# Patient Record
Sex: MALE | Race: BLACK OR AFRICAN AMERICAN | NOT HISPANIC OR LATINO | Employment: OTHER | URBAN - METROPOLITAN AREA
[De-identification: names, ages, dates, MRNs, and addresses within clinical notes are randomized per-mention and may not be internally consistent; named-entity substitution may affect disease eponyms.]

---

## 2022-08-22 ENCOUNTER — HOSPITAL ENCOUNTER (EMERGENCY)
Facility: HOSPITAL | Age: 42
Discharge: HOME | End: 2022-08-22
Attending: EMERGENCY MEDICINE

## 2022-08-22 VITALS
HEIGHT: 76 IN | TEMPERATURE: 98 F | BODY MASS INDEX: 26.18 KG/M2 | HEART RATE: 74 BPM | OXYGEN SATURATION: 98 % | WEIGHT: 215 LBS | DIASTOLIC BLOOD PRESSURE: 91 MMHG | SYSTOLIC BLOOD PRESSURE: 132 MMHG | RESPIRATION RATE: 16 BRPM

## 2022-08-22 DIAGNOSIS — S05.00XA CORNEAL ABRASION, UNSPECIFIED LATERALITY, INITIAL ENCOUNTER: Primary | ICD-10-CM

## 2022-08-22 PROCEDURE — 99283 EMERGENCY DEPT VISIT LOW MDM: CPT | Mod: ER

## 2022-08-22 PROCEDURE — 25000003 PHARM REV CODE 250: Mod: ER | Performed by: NURSE PRACTITIONER

## 2022-08-22 RX ORDER — ERYTHROMYCIN 5 MG/G
OINTMENT OPHTHALMIC
Status: COMPLETED | OUTPATIENT
Start: 2022-08-22 | End: 2022-08-22

## 2022-08-22 RX ORDER — TETRACAINE HYDROCHLORIDE 5 MG/ML
2 SOLUTION OPHTHALMIC
Status: COMPLETED | OUTPATIENT
Start: 2022-08-22 | End: 2022-08-22

## 2022-08-22 RX ORDER — ERYTHROMYCIN 5 MG/G
OINTMENT OPHTHALMIC
Qty: 3.5 G | Refills: 0 | Status: SHIPPED | OUTPATIENT
Start: 2022-08-22

## 2022-08-22 RX ADMIN — ERYTHROMYCIN 1 INCH: 5 OINTMENT OPHTHALMIC at 10:08

## 2022-08-22 RX ADMIN — FLUORESCEIN SODIUM 1 EACH: 1 STRIP OPHTHALMIC at 08:08

## 2022-08-22 RX ADMIN — TETRACAINE HYDROCHLORIDE 2 DROP: 5 SOLUTION OPHTHALMIC at 08:08

## 2022-08-22 NOTE — ED PROVIDER NOTES
"Encounter Date: 8/22/2022       History     Chief Complaint   Patient presents with    Foreign Body in Eye     For 2-3 days, , denies visual distrubances     HPI      41y M w/ no PMH presents w/ bilat eye pain. He believes he "has something caught under his R eye lid". He is a  and believes he accidentally got something in his eyes 2 days ago. He reports he was wearing contacts at the time. He has tried OTC eye drops w/o success. He denies blurred vision or double vision. He reports his tetanus is UTD. Denies allergies.  Review of patient's allergies indicates:  No Known Allergies  History reviewed. No pertinent past medical history.  History reviewed. No pertinent surgical history.  History reviewed. No pertinent family history.  Social History     Tobacco Use    Smoking status: Current Every Day Smoker    Smokeless tobacco: Never Used   Substance Use Topics    Alcohol use: Not Currently     Comment: occ    Drug use: Never     Review of Systems  Constitutional: Negative for appetite change, chills, diaphoresis, fatigue and fever.   HENT: Negative for congestion, ear discharge, ear pain, postnasal drip, rhinorrhea, sinus pressure, sneezing, sore throat and voice change.    Eyes: Positive for pain and redness. Negative for photophobia, discharge, itching and visual disturbance.   Respiratory: Negative for cough, shortness of breath and wheezing.    Cardiovascular: Negative for chest pain, palpitations and leg swelling.   Gastrointestinal: Negative for abdominal pain, nausea and vomiting.   Endocrine: Negative for polydipsia, polyphagia and polyuria.   Genitourinary: Negative for difficulty urinating, dysuria, frequency, hematuria, penile discharge, penile pain, penile swelling and urgency.   Musculoskeletal: Negative for arthralgias and myalgias.   Skin: Negative for rash and wound.   Neurological: Negative for dizziness, seizures, syncope and weakness.   Hematological: Negative for adenopathy. Does " not bruise/bleed easily.   Psychiatric/Behavioral: Negative for agitation and self-injury. The patient is not nervous/anxious.    Physical Exam     Initial Vitals [08/22/22 0822]   BP Pulse Resp Temp SpO2   134/89 71 20 97.6 °F (36.4 °C) 97 %      MAP       --         Physical Exam  Nursing note and vitals reviewed.  Constitutional: He appears well-developed and well-nourished. He is not diaphoretic. No distress.   HENT:   Head: Normocephalic and atraumatic.   Right Ear: External ear normal.   Left Ear: External ear normal.   Nose: Nose normal.   Eyes: Conjunctivae, EOM and lids are normal. Pupils are equal, round, and reactive to light. Lids are everted and swept, no foreign bodies found. Right eye exhibits no discharge and no exudate. No foreign body present in the right eye. Left eye exhibits no discharge and no exudate. No foreign body present in the left eye. No scleral icterus. Fluorescein aided exam demonstrates <1mm area of uptake on the right eye, border of iris laterally. IOP 15bilat with 95% CI  Neck:   Normal range of motion.  Cardiovascular: Normal rate, regular rhythm, normal heart sounds and intact distal pulses.   Pulmonary/Chest: No respiratory distress. He has no wheezes. He has no rhonchi. He has no rales.   Abdominal: Abdomen is soft. Bowel sounds are normal. He exhibits no distension.   Musculoskeletal:         General: Normal range of motion.      Cervical back: Normal range of motion.     Neurological: He is alert and oriented to person, place, and time.   Skin: Skin is warm and dry. Capillary refill takes less than 2 seconds. No rash noted.   Psychiatric: He has a normal mood and affect. Thought content normal.   ED Course   Procedures  Labs Reviewed - No data to display       Imaging Results    None          Medications   TETRAcaine HCl (PF) 0.5 % Drop 2 drop (2 drops Both Eyes Given 8/22/22 0857)   fluorescein ophthalmic strip 1 each (1 each Both Eyes Given 8/22/22 0857)   erythromycin 5  mg/gram (0.5 %) ophthalmic ointment (1 inch Both Eyes Given 8/22/22 1004)                 ED Course as of 08/22/22 1123   Mon Aug 22, 2022   0845 BP: 134/89 [VC]   0845 Temp: 97.6 °F (36.4 °C) [VC]   0845 Temp src: Oral [VC]   0845 Pulse: 71 [VC]   0845 Resp: 20 [VC]   0845 SpO2: 97 % [VC]   0845 BP: 134/89 [VC]   0845 Temp: 97.6 °F (36.4 °C) [VC]   0845 Temp src: Oral [VC]   0845 Pulse: 71 [VC]   0845 Resp: 20 [VC]   0845 SpO2: 97 % [VC]   0851 42yo male who is a  reports 2d ago felt like something was in his right eye. Wears contacts and was wearing them when this happened.  There is photophobia.  Feeling of foreign body.  Pain worse with eye closed. Has tried otc eye drops unknown.  Dt is utd. [VC]      ED Course User Index  [VC] Franco Rocha, MAUREEN          on examination there was a small area of uptake on the right eye.  The right lower lid was also mildly swollen which may indicate an oncoming hordeola.  Secondary to the patient's history I do not believe that this is preseptal cellulitis or orbital cellulitis.  Other differentials include ruptured globe, foreign body, rust ring.  Erythromycin ointment was applied patient was prescribed same and he should follow up with Ophthalmology, wear protective eye wear.      See AVS for additional recommendations. Medications listed herein were prescribed after reviewing the patient's allergies, medication list, history, most recent laboratories as available.  Referrals below were provided after reviewing the patient's previous medical providers. He understands he  should return for any worsening or changes in condition.  Prior to discharge the patient was asked if he  had any additional concerns or complaints and he declined. The patient was given an opportunity to ask questions and all were answered to his satisfaction.    The patient was seen by FELIPE Schroeder, with my direct supervision.  The student performed interview and physical with my  assistance. The student has contributed portions of this document.          Clinical Impression:   Final diagnoses:  [S05.00XA] Corneal abrasion, unspecified laterality, initial encounter (Primary)          ED Disposition Condition    Discharge Stable        ED Prescriptions     Medication Sig Dispense Start Date End Date Auth. Provider    erythromycin (ROMYCIN) ophthalmic ointment Place a 1/2 inch ribbon of ointment into the lower eyelid ou tid 3.5 g 8/22/2022  Franco Rocha DNP        Follow-up Information     Follow up With Specialties Details Why Contact Info    Anthony Rodriguez MD Ophthalmology Schedule an appointment as soon as possible for a visit   [address] [facility] [address]  [phone]             Franco Rocha DNP  08/22/22 1128

## 2022-08-22 NOTE — DISCHARGE INSTRUCTIONS
Sunglasses or sun protection. Take antibiotics as ordered. Return to the Emergency Department for any worsening, change in condition, or any emergent concerns.

## 2022-08-22 NOTE — MEDICAL/APP STUDENT
"  History     Chief Complaint   Patient presents with    Foreign Body in Eye     For 2-3 days, , denies visual distrubances     41y M w/ no PMH presents w/ bilat eye pain. He believes he "has something caught under his R eye lid". He is a  and believes he accidentally got something in his eyes 2 days ago. He reports he was wearing contacts at the time. He has tried OTC eye drops w/o success. He denies blurred vision or double vision. He reports his tetanus is UTD. Denies allergies.          History reviewed. No pertinent past medical history.    History reviewed. No pertinent surgical history.    History reviewed. No pertinent family history.    Social History     Tobacco Use    Smoking status: Current Every Day Smoker    Smokeless tobacco: Never Used   Substance Use Topics    Alcohol use: Not Currently     Comment: occ    Drug use: Never       Review of Systems   Constitutional: Negative for appetite change, chills, diaphoresis, fatigue and fever.   HENT: Negative for congestion, ear discharge, ear pain, postnasal drip, rhinorrhea, sinus pressure, sneezing, sore throat and voice change.    Eyes: Positive for pain and redness. Negative for photophobia, discharge, itching and visual disturbance.   Respiratory: Negative for cough, shortness of breath and wheezing.    Cardiovascular: Negative for chest pain, palpitations and leg swelling.   Gastrointestinal: Negative for abdominal pain, nausea and vomiting.   Endocrine: Negative for polydipsia, polyphagia and polyuria.   Genitourinary: Negative for difficulty urinating, dysuria, frequency, hematuria, penile discharge, penile pain, penile swelling and urgency.   Musculoskeletal: Negative for arthralgias and myalgias.   Skin: Negative for rash and wound.   Neurological: Negative for dizziness, seizures, syncope and weakness.   Hematological: Negative for adenopathy. Does not bruise/bleed easily.   Psychiatric/Behavioral: Negative for agitation and " "self-injury. The patient is not nervous/anxious.        Physical Exam   /89   Pulse 71   Temp 97.6 °F (36.4 °C) (Oral)   Resp 20   Ht 6' 4" (1.93 m)   Wt 97.5 kg (215 lb)   SpO2 97%   BMI 26.17 kg/m²     Physical Exam    Nursing note and vitals reviewed.  Constitutional: He appears well-developed and well-nourished. He is not diaphoretic. No distress.   HENT:   Head: Normocephalic and atraumatic.   Right Ear: External ear normal.   Left Ear: External ear normal.   Nose: Nose normal.   Eyes: Conjunctivae, EOM and lids are normal. Pupils are equal, round, and reactive to light. Lids are everted and swept, no foreign bodies found. Right eye exhibits no discharge and no exudate. No foreign body present in the right eye. Left eye exhibits no discharge and no exudate. No foreign body present in the left eye. No scleral icterus.   Neck:   Normal range of motion.  Cardiovascular: Normal rate, regular rhythm, normal heart sounds and intact distal pulses.   Pulmonary/Chest: No respiratory distress. He has no wheezes. He has no rhonchi. He has no rales.   Abdominal: Abdomen is soft. Bowel sounds are normal. He exhibits no distension.   Musculoskeletal:         General: Normal range of motion.      Cervical back: Normal range of motion.     Neurological: He is alert and oriented to person, place, and time.   Skin: Skin is warm and dry. Capillary refill takes less than 2 seconds. No rash noted.   Psychiatric: He has a normal mood and affect. Thought content normal.         ED Course   41y M w/ no PMH presents w/ possible foreign body to bilat eyes. Works as  and believes he has metal stuck under eye lid. Ocular exam, slit light exam, eyelid sweep, and fluorescein eye stain performed. Corneal abrasion noted, no foreign body seen on exam.      Differential Diagnosis: corneal abrasion, conjunctivitis, uveitis      Erythromycin ointment         "

## 2022-08-22 NOTE — ED NOTES
nad at this time. demonstrated  And educated pt on correct way to apply ointment to lower eyelids. Pt denies vision changes and verbalized understanding to wear sun glasses while outdoors. rx and d/c info given to and reviewed with pt.  Pt amb out to pov.

## 2023-06-27 ENCOUNTER — HOSPITAL ENCOUNTER (EMERGENCY)
Facility: HOSPITAL | Age: 43
Discharge: HOME | End: 2023-06-27
Attending: EMERGENCY MEDICINE

## 2023-06-27 VITALS
HEIGHT: 77 IN | BODY MASS INDEX: 26.57 KG/M2 | RESPIRATION RATE: 16 BRPM | DIASTOLIC BLOOD PRESSURE: 81 MMHG | SYSTOLIC BLOOD PRESSURE: 142 MMHG | HEART RATE: 94 BPM | TEMPERATURE: 98 F | OXYGEN SATURATION: 95 % | WEIGHT: 225 LBS

## 2023-06-27 DIAGNOSIS — R04.0 EPISTAXIS: Primary | ICD-10-CM

## 2023-06-27 PROCEDURE — 99283 EMERGENCY DEPT VISIT LOW MDM: CPT | Mod: ER

## 2023-06-27 RX ORDER — MUPIROCIN 20 MG/G
OINTMENT TOPICAL 3 TIMES DAILY
Qty: 7 G | Refills: 0 | Status: SHIPPED | OUTPATIENT
Start: 2023-06-27 | End: 2023-07-04

## 2025-06-16 ENCOUNTER — HOSPITAL ENCOUNTER (EMERGENCY)
Facility: HOSPITAL | Age: 45
Discharge: HOME | End: 2025-06-16
Attending: SURGERY

## 2025-06-16 VITALS
BODY MASS INDEX: 28.21 KG/M2 | DIASTOLIC BLOOD PRESSURE: 91 MMHG | TEMPERATURE: 99 F | WEIGHT: 231.69 LBS | OXYGEN SATURATION: 96 % | RESPIRATION RATE: 18 BRPM | SYSTOLIC BLOOD PRESSURE: 174 MMHG | HEIGHT: 76 IN | HEART RATE: 92 BPM

## 2025-06-16 DIAGNOSIS — K62.5 RECTAL BLEEDING: Primary | ICD-10-CM

## 2025-06-16 LAB
ABSOLUTE EOSINOPHIL (OHS): 0.01 K/UL
ABSOLUTE MONOCYTE (OHS): 0.5 K/UL (ref 0.3–1)
ABSOLUTE NEUTROPHIL COUNT (OHS): 3.41 K/UL (ref 1.8–7.7)
ALBUMIN SERPL BCP-MCNC: 4.7 G/DL (ref 3.5–5.2)
ALP SERPL-CCNC: 60 UNIT/L (ref 40–150)
ALT SERPL W/O P-5'-P-CCNC: 35 UNIT/L (ref 10–44)
ANION GAP (OHS): 11 MMOL/L (ref 8–16)
AST SERPL-CCNC: 24 UNIT/L (ref 11–45)
BASOPHILS # BLD AUTO: 0.03 K/UL
BASOPHILS NFR BLD AUTO: 0.5 %
BILIRUB SERPL-MCNC: 0.6 MG/DL (ref 0.1–1)
BUN SERPL-MCNC: 14 MG/DL (ref 6–20)
CALCIUM SERPL-MCNC: 9.5 MG/DL (ref 8.7–10.5)
CHLORIDE SERPL-SCNC: 103 MMOL/L (ref 95–110)
CO2 SERPL-SCNC: 22 MMOL/L (ref 23–29)
CREAT SERPL-MCNC: 1 MG/DL (ref 0.5–1.4)
ERYTHROCYTE [DISTWIDTH] IN BLOOD BY AUTOMATED COUNT: 12 % (ref 11.5–14.5)
GFR SERPLBLD CREATININE-BSD FMLA CKD-EPI: >60 ML/MIN/1.73/M2
GLUCOSE SERPL-MCNC: 108 MG/DL (ref 70–110)
HCT VFR BLD AUTO: 42.7 % (ref 40–54)
HGB BLD-MCNC: 15.2 GM/DL (ref 14–18)
HOLD SPECIMEN: NORMAL
IMM GRANULOCYTES # BLD AUTO: 0.01 K/UL (ref 0–0.04)
IMM GRANULOCYTES NFR BLD AUTO: 0.2 % (ref 0–0.5)
LIPASE SERPL-CCNC: 8 U/L (ref 4–60)
LYMPHOCYTES # BLD AUTO: 2.34 K/UL (ref 1–4.8)
MCH RBC QN AUTO: 31.3 PG (ref 27–31)
MCHC RBC AUTO-ENTMCNC: 35.6 G/DL (ref 32–36)
MCV RBC AUTO: 88 FL (ref 82–98)
NUCLEATED RBC (/100WBC) (OHS): 0 /100 WBC
PLATELET # BLD AUTO: 220 K/UL (ref 150–450)
PMV BLD AUTO: 10.6 FL (ref 9.2–12.9)
POTASSIUM SERPL-SCNC: 3.4 MMOL/L (ref 3.5–5.1)
PROT SERPL-MCNC: 8.3 GM/DL (ref 6–8.4)
RBC # BLD AUTO: 4.85 M/UL (ref 4.6–6.2)
RELATIVE EOSINOPHIL (OHS): 0.2 %
RELATIVE LYMPHOCYTE (OHS): 37.1 % (ref 18–48)
RELATIVE MONOCYTE (OHS): 7.9 % (ref 4–15)
RELATIVE NEUTROPHIL (OHS): 54.1 % (ref 38–73)
SODIUM SERPL-SCNC: 136 MMOL/L (ref 136–145)
WBC # BLD AUTO: 6.3 K/UL (ref 3.9–12.7)

## 2025-06-16 PROCEDURE — 85025 COMPLETE CBC W/AUTO DIFF WBC: CPT

## 2025-06-16 PROCEDURE — 80053 COMPREHEN METABOLIC PANEL: CPT

## 2025-06-16 PROCEDURE — 83690 ASSAY OF LIPASE: CPT

## 2025-06-16 PROCEDURE — 99283 EMERGENCY DEPT VISIT LOW MDM: CPT

## 2025-06-16 NOTE — ED PROVIDER NOTES
Encounter Date: 6/16/2025       History     Chief Complaint   Patient presents with    Rectal Bleeding     Pt arrived to ED c/o dark red blood in his stool that started this morning. Pt denies abdominal pain, nausea, vomiting, and diarrhea.      This note is dictated on M*Modal word recognition program.  There are word recognition mistakes and grammatical errors that are occasionally missed on review.     Sandro Humphries is a 44 y.o. male with no significant past medical history besides being a cigarette smoker for the last 15 years.  Presents to ER today with complaints of non painful dark red blood in stool this morning when he had a bowel movement.  Patient denies abdominal pain denies nausea vomiting reports stool was loose.  Denies any fevers.      The history is provided by the patient.     Review of patient's allergies indicates:  No Known Allergies  History reviewed. No pertinent past medical history.  History reviewed. No pertinent surgical history.  No family history on file.  Social History[1]  Review of Systems   Gastrointestinal:  Positive for blood in stool. Negative for abdominal distention, abdominal pain, anal bleeding and rectal pain.   All other systems reviewed and are negative.      Physical Exam     Initial Vitals [06/16/25 1118]   BP Pulse Resp Temp SpO2   (!) 183/90 100 20 99.3 °F (37.4 °C) 95 %      MAP       --         Physical Exam    Vitals reviewed.  Constitutional: He appears well-developed.   Eyes: Pupils are equal, round, and reactive to light.   Neck: Neck supple.   Normal range of motion.  Cardiovascular:  Normal rate and regular rhythm.           Pulmonary/Chest: No respiratory distress.   Abdominal: Abdomen is soft. Bowel sounds are normal. He exhibits no distension and no mass. There is no abdominal tenderness. There is no rebound and no guarding.   Musculoskeletal:         General: No edema.      Cervical back: Normal range of motion and neck supple.     Neurological: He is alert  and oriented to person, place, and time. GCS score is 15. GCS eye subscore is 4. GCS verbal subscore is 5. GCS motor subscore is 6.   Skin: Capillary refill takes less than 2 seconds.   Psychiatric: He has a normal mood and affect. His behavior is normal. Judgment and thought content normal.         ED Course   Procedures  Labs Reviewed   COMPREHENSIVE METABOLIC PANEL - Abnormal       Result Value    Sodium 136      Potassium 3.4 (*)     Chloride 103      CO2 22 (*)     Glucose 108      BUN 14      Creatinine 1.0      Calcium 9.5      Protein Total 8.3      Albumin 4.7      Bilirubin Total 0.6      ALP 60      AST 24      ALT 35      Anion Gap 11      eGFR >60     CBC WITH DIFFERENTIAL - Abnormal    WBC 6.30      RBC 4.85      HGB 15.2      HCT 42.7      MCV 88      MCH 31.3 (*)     MCHC 35.6      RDW 12.0      Platelet Count 220      MPV 10.6      Nucleated RBC 0      Neut % 54.1      Lymph % 37.1      Mono % 7.9      Eos % 0.2      Basophil % 0.5      Imm Grans % 0.2      Neut # 3.41      Lymph # 2.34      Mono # 0.50      Eos # 0.01      Baso # 0.03      Imm Grans # 0.01     LIPASE - Normal    Lipase Level 8     CBC W/ AUTO DIFFERENTIAL    Narrative:     The following orders were created for panel order CBC auto differential.  Procedure                               Abnormality         Status                     ---------                               -----------         ------                     CBC with Differential[[id number]]        Abnormal            Final result                 Please view results for these tests on the individual orders.   EXTRA TUBES    Narrative:     The following orders were created for panel order EXTRA TUBES.  Procedure                               Abnormality         Status                     ---------                               -----------         ------                     Light Green Top Hold[[id number]]                             Final result                 Please view  results for these tests on the individual orders.   LIGHT GREEN TOP HOLD    Extra Tube Hold for add-ons.            Imaging Results    None          Medications - No data to display  Medical Decision Making  Differential diagnosis includes diverticular disease, internal hemorrhoid, external hemorrhoid, colon polyp, painless rectal bleeding    Physical exam today reveals no acute abdominal tenderness.  Patient denies any anal or rectal pain.  Patient denies any known hemorrhoid.  CBC today reveals no anemia  CMP reveals a potassium 3.4 otherwise renal and liver function within normal limits  Patient advised to follow-up with GI versus colorectal specialist to get likely colonoscopy for painless GI bleeding.  Ambulatory referral to colorectal placed within [facility] system  Patient advised to return to ER immediately if he develops any worsening or concerning symptoms  Patient has no abdominal pain whatsoever on examination today  Patient had no bloody bowel movements while in the ER this afternoon.  Patient stable at time of discharge in no acute distress.  No life-threatening illnesses were found during ER visit today.  Patient was instructed to follow-up with PCP or other recommended specialist within the next 48-72 hours.  Patient was instructed to return to ER immediately for any worsening or concerning symptoms.  All discharge instructions discussed with patient, and patient agrees to comply with discharge instructions given today.     Amount and/or Complexity of Data Reviewed  Labs: ordered.                                      Clinical Impression:  Final diagnoses:  [K62.5] Rectal bleeding (Primary)          ED Disposition Condition    Discharge Stable          ED Prescriptions    None       Follow-up Information    None                [1]   Social History  Tobacco Use    Smoking status: Every Day    Smokeless tobacco: Never   Substance Use Topics    Alcohol use: Not Currently     Comment: occ    Drug use: Never         Scott Larsen, NP  06/16/25 6128

## 2025-06-16 NOTE — Clinical Note
"Sandro Taylor" Yandel was seen and treated in our emergency department on 6/16/2025.  He may return to work on 06/17/2025.       If you have any questions or concerns, please don't hesitate to call.      Scott Larsen, NP"

## 2025-06-16 NOTE — ED TRIAGE NOTES
Pt arrived to ED c/o dark red blood in his stool that started this morning. Pt denies abdominal pain, nausea, vomiting, and diarrhea.

## 2025-06-16 NOTE — DISCHARGE INSTRUCTIONS
Please follow-up with GI specialist/colorectal specialist as you will likely need colonoscopy to evaluate your rectal bleeding.  As we discussed please return to ER immediately if you develop any worsening or concerning symptoms